# Patient Record
Sex: MALE | Race: WHITE
[De-identification: names, ages, dates, MRNs, and addresses within clinical notes are randomized per-mention and may not be internally consistent; named-entity substitution may affect disease eponyms.]

---

## 2020-07-05 ENCOUNTER — HOSPITAL ENCOUNTER (EMERGENCY)
Dept: HOSPITAL 74 - JERFT | Age: 35
Discharge: HOME | End: 2020-07-05
Payer: COMMERCIAL

## 2020-07-05 VITALS — TEMPERATURE: 98.9 F | DIASTOLIC BLOOD PRESSURE: 64 MMHG | HEART RATE: 56 BPM | SYSTOLIC BLOOD PRESSURE: 117 MMHG

## 2020-07-05 VITALS — BODY MASS INDEX: 36.8 KG/M2

## 2020-07-05 DIAGNOSIS — T15.91XA: Primary | ICD-10-CM

## 2020-07-05 PROCEDURE — 3E0234Z INTRODUCTION OF SERUM, TOXOID AND VACCINE INTO MUSCLE, PERCUTANEOUS APPROACH: ICD-10-PCS | Performed by: NURSE PRACTITIONER

## 2020-07-05 NOTE — PDOC
History of Present Illness





- General


Chief Complaint: Eye Problem


Stated Complaint: R EYE PAIN


Time Seen by Provider: 07/05/20 17:33


History Source: Patient


Exam Limitations: No Limitations





- History of Present Illness


Initial Comments: 





07/05/20 19:46





HISTORY OF PRESENT ILLNESS: 35-year-old male presents emergency department for 

evaluation of foreign body sensation to his right eye.  Patient reports 3 days 

ago he was using an angle  without using safety glasses and felt 

something fly into his eye.  Since then he has had progressive photophobia and 

persistence foreign body sensation.  He denies any visual disturbances.  Patient

is unsure of his last tetanus shot.





No recent travel or sick contacts. 


PAST MEDICAL HISTORY: Denies past medical history


SURGICAL HISTORY: Denies


ALLERGIES: No known drug allergies





REVIEW OF SYSTEMS


General/Constitutional: Denies fever or chills. Denies weakness, weight change.


HEENT: See HPI


Cardiovascular: Denies chest pain or shortness of breath.


Respiratory: Denies cough, wheezing, or hemoptysis.


Gastrointestinal: Denies nausea, vomiting, diarrhea or constipation. Denies 

rectal bleeding.


Genitourinary: Denies dysuria, frequency, or change in urination.


Musculoskeletal: Denies joint or muscle swelling or pain. Denies neck or back 

pain.


Skin and breasts: Denies rash or easy bruising.


Neurologic: Denies headache, vertigo, loss of consciousness, or loss of 

sensation.


Psychiatric: Denies depression or anxiety.


Endocrine: Denies increased thirst. Denies abnormal weight change.


Hematologic/Lymphatic: Denies anemia, easy bleeding, or history of blood clots.


Allergic/Immunologic: Denies hives or skin allergy. Denies latex allergy.











PHYSICAL EXAM


General Appearance: Well-appearing, appropriately dressed.  No apparent dis

tress, no intoxication.


HEENT: EOMI, PERRLA, normal ENT inspection, normal voice, TMs normal, pharynx 

normal.  No conjunctival pallor.  No photophobia, scleral icterus.


EYE EXAMINATION:


Visual acuity: 20/20 in the left eye, 20/40 in the right eye, near, uncorrected


The lid and lashes are normal.


Extraocular movements are intact.


The conjunctiva is erythematous with scleral injection.  No discharge is noted.


The corneal surface is normal post tetracaine and fluorescein.  Foreign body 

presents to the anterior cornea at the 7 o'clock position of the iris of the 

right eye.  No rust ring present.  There is no abnormal fluorescein uptake.


The pupils are equal, round and reactive to light.








Past History





- Medical History


Allergies/Adverse Reactions: 


                                    Allergies











Allergy/AdvReac Type Severity Reaction Status Date / Time


 


No Known Allergies Allergy   Verified 07/05/20 17:35











Home Medications: 


Ambulatory Orders





NK [No Known Home Medication]  08/18/16 








COPD: No





- Psycho-Social/Smoking History


Smoking History: Never smoked





- Substance Abuse Hx (Audit-C & DAST Scrn)


How often the patient has a drink containing alcohol: Never


Score: In Men: 4 or > Positive; In Women: 3 or > Positive: 0


Screen Result (Pos requires Nsg. Audit-10AR): Negative





*Physical Exam





- Vital Signs


                                Last Vital Signs











Temp Pulse Resp BP Pulse Ox


 


 98.9 F   56 L  18   117/64   97 


 


 07/05/20 17:32  07/05/20 17:32  07/05/20 17:32  07/05/20 17:32  07/05/20 17:32














ED Treatment Course





- Medications


Given in the ED: 


ED Medications














Discontinued Medications














Generic Name Dose Route Start Last Admin





  Trade Name Freq  PRN Reason Stop Dose Admin


 


Diphtheria/Tetanus/Acell Pertussis  0.5 ml  07/05/20 18:43  07/05/20 18:48





  Boostrix -  IM  07/05/20 18:44  0.5 ml





  .ONCE ONE   Administration


 


Erythromycin  1 applic  07/05/20 18:42  07/05/20 18:46





  Erythromycin 0.5% Eye Ointment  OD  07/05/20 18:43  1 applic





  ONCE ONE   Administration


 


Fluorescein Sodium  1 ea  07/05/20 18:37  07/05/20 18:46





  Fluorets -  OD  07/05/20 18:38  1 ea





  ONCE ONE   Administration


 


Tetracaine HCl  1 drop  07/05/20 18:37  07/05/20 18:46





  Pontocaine  OD  07/05/20 18:38  1 drop





  ONCE ONE   Administration














Medical Decision Making





- Medical Decision Making





07/05/20 19:50


A/P:


35-year-old male with foreign body present to right cornea





Solid foreign body present to the right cornea at the 7 o'clock position


Scleral injection present


No abnormal fluorescein uptake


Attempt to remove foreign body using flushes, cotton tip swab with erythromycin 

and bevel of an 18-gauge needle proven to be unsuccessful.  No additional trauma

incurred.


Page placed to ophthalmology without return call.  Second page placed at 730.  

Awaiting callback at this time.


07/05/20 20:20


Case has been discussed with Dr. Mojica of ophthalmology who states patient

is able to be discharged with erythromycin ointment and can follow-up as an 

outpatient.


07/05/20 20:24


I discussed the physical exam findings, ancillary test results and final 

diagnoses with the patient. I answered all of the patient's questions. The 

patient was satisfied with the care received and felt comfortable with the 

discharge plan and treatment plan. The patient will call their primary care 

physician within 24 hours to arrange follow-up and will return to the Emergency 

Department with any new, persistent or worsening symptoms.





Portions of this note have been documented using voice recognition software. As 

a result, errors may occur in the transcription process. Effort has been made to

correct all grammatical and transcription error, but some may have been missed 

which may produce sporadic inaccurate transcription or nonsensical phrases.





Discharge





- Discharge Information


Problems reviewed: Yes


Clinical Impression/Diagnosis: 


Foreign body of right eye


Qualifiers:


 Encounter type: initial encounter Qualified Code(s): T15.91XA - Foreign body on

external eye, part unspecified, right eye, initial encounter





Condition: Fair


Disposition: HOME





- Admission


No





- Follow up/Referral


Referrals: 


Flor Mojica MD [Staff Physician] - 


Kamar Walters MD [Staff Physician] - 





- Patient Discharge Instructions


Additional Instructions: 


Rest, avoid rubbing eyes


Wash hands, use eye drops as directed, wash hands after use 


May use eye lubricating drops as often as needed


erythromycin ointment, one thin film 3 times a day for 5 days


Tylenol or ibuprofen for pain relief





Followup with ophthalmology tomorrow for thorough exam.  An ophthalmologist has 

been provided with his name and contact information.


Return to emergency department for worsened pain, swelling, vision problems.





- Post Discharge Activity

## 2020-07-05 NOTE — PDOC
Rapid Medical Evaluation


Chief Complaint: Eye Problem


Time Seen by Provider: 07/05/20 17:33


Medical Evaluation: 


                                    Allergies











Allergy/AdvReac Type Severity Reaction Status Date / Time


 


No Known Allergies Allergy   Verified 08/18/16 08:38











07/05/20 17:33


I have performed a brief in-person evaluation of this patient.


The patient presents with a chief complaint of: Rt eye pain and foreign body 

sensation x 3 days s/p cutting  with metalwork and not wearing goggles 

and feels something went in to the right eye


Pertinent physical exam findings: tiny black foreign object on right pupil. 

mildly injected right conjunctiva.


I have ordered the following: nothing


The patient will proceed to the ED for further evaluation.





**Discharge Disposition





- Diagnosis


Foreign body of right eye


Qualifiers:


 Encounter type: initial encounter Qualified Code(s): T15.91XA - Foreign body on

external eye, part unspecified, right eye, initial encounter








- Discharge Dispostion


Condition at time of disposition: Stable





- Referrals





- Patient Instructions





- Post Discharge Activity supine Trendelenburg